# Patient Record
Sex: MALE | Race: BLACK OR AFRICAN AMERICAN | NOT HISPANIC OR LATINO | URBAN - METROPOLITAN AREA
[De-identification: names, ages, dates, MRNs, and addresses within clinical notes are randomized per-mention and may not be internally consistent; named-entity substitution may affect disease eponyms.]

---

## 2023-06-16 DIAGNOSIS — I10 ESSENTIAL HYPERTENSION, BENIGN: ICD-10-CM

## 2023-06-20 RX ORDER — HYDROCHLOROTHIAZIDE 25 MG/1
25 TABLET ORAL DAILY
Qty: 30 TABLET | Refills: 0 | Status: SHIPPED | OUTPATIENT
Start: 2023-06-20 | End: 2023-07-17

## 2023-06-20 RX ORDER — AMLODIPINE BESYLATE 5 MG/1
5 TABLET ORAL DAILY
Qty: 30 TABLET | Refills: 0 | Status: SHIPPED | OUTPATIENT
Start: 2023-06-20 | End: 2023-07-17

## 2023-06-23 ENCOUNTER — APPOINTMENT (OUTPATIENT)
Dept: PRIMARY CARE | Facility: CLINIC | Age: 43
End: 2023-06-23
Payer: COMMERCIAL

## 2023-07-17 DIAGNOSIS — I10 ESSENTIAL HYPERTENSION, BENIGN: ICD-10-CM

## 2023-07-17 RX ORDER — HYDROCHLOROTHIAZIDE 25 MG/1
TABLET ORAL
Qty: 30 TABLET | Refills: 0 | Status: SHIPPED | OUTPATIENT
Start: 2023-07-17 | End: 2023-08-28 | Stop reason: SDUPTHER

## 2023-07-17 RX ORDER — AMLODIPINE BESYLATE 5 MG/1
5 TABLET ORAL DAILY
Qty: 30 TABLET | Refills: 0 | Status: SHIPPED | OUTPATIENT
Start: 2023-07-17 | End: 2023-08-28 | Stop reason: SDUPTHER

## 2023-08-28 ENCOUNTER — OFFICE VISIT (OUTPATIENT)
Dept: PRIMARY CARE | Facility: CLINIC | Age: 43
End: 2023-08-28
Payer: COMMERCIAL

## 2023-08-28 VITALS
WEIGHT: 301 LBS | SYSTOLIC BLOOD PRESSURE: 150 MMHG | BODY MASS INDEX: 39.71 KG/M2 | HEART RATE: 60 BPM | DIASTOLIC BLOOD PRESSURE: 88 MMHG

## 2023-08-28 DIAGNOSIS — Z00.00 HEALTH CARE MAINTENANCE: Primary | ICD-10-CM

## 2023-08-28 DIAGNOSIS — I10 ESSENTIAL HYPERTENSION, BENIGN: ICD-10-CM

## 2023-08-28 PROCEDURE — 99213 OFFICE O/P EST LOW 20 MIN: CPT | Performed by: INTERNAL MEDICINE

## 2023-08-28 PROCEDURE — 3079F DIAST BP 80-89 MM HG: CPT | Performed by: INTERNAL MEDICINE

## 2023-08-28 PROCEDURE — 3077F SYST BP >= 140 MM HG: CPT | Performed by: INTERNAL MEDICINE

## 2023-08-28 PROCEDURE — 1036F TOBACCO NON-USER: CPT | Performed by: INTERNAL MEDICINE

## 2023-08-28 RX ORDER — AMLODIPINE BESYLATE 5 MG/1
5 TABLET ORAL DAILY
Qty: 90 TABLET | Refills: 0 | Status: SHIPPED | OUTPATIENT
Start: 2023-08-28 | End: 2023-08-28 | Stop reason: SDUPTHER

## 2023-08-28 RX ORDER — AMLODIPINE BESYLATE 5 MG/1
10 TABLET ORAL DAILY
Qty: 90 TABLET | Refills: 0 | Status: SHIPPED | OUTPATIENT
Start: 2023-08-28 | End: 2023-11-20

## 2023-08-28 RX ORDER — HYDROCHLOROTHIAZIDE 25 MG/1
25 TABLET ORAL DAILY
Qty: 90 TABLET | Refills: 0 | Status: SHIPPED | OUTPATIENT
Start: 2023-08-28 | End: 2023-11-22

## 2023-08-28 ASSESSMENT — PATIENT HEALTH QUESTIONNAIRE - PHQ9
2. FEELING DOWN, DEPRESSED OR HOPELESS: NOT AT ALL
1. LITTLE INTEREST OR PLEASURE IN DOING THINGS: NOT AT ALL
SUM OF ALL RESPONSES TO PHQ9 QUESTIONS 1 AND 2: 0

## 2023-08-28 NOTE — PROGRESS NOTES
Subjective   Patient ID: Madhu Corado is a 43 y.o. male who presents for Follow-up.    HPI     Patient is a 43-year-old male with past medical history of hypertension who presents for follow-up.  Of note patient had a recent rotator cuff surgery in April and has been out of work since then.  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  He has apointment to see the orthopedist today for reevaluation of the shoulder.    His blood pressure today is uncontrolled but has been out of his medications for over 24 hours.  He needs refills.  He denies headache changes in vision orthopnea.  He reports otherwise compliance with his medication.  He has not been seen in more than a year.    Review of Systems  Constitutional: No fever or chills  Cardiovascular: no chest pain, no palpitations and no syncope.   Respiratory: no cough, no shortness of breath during exertion and no shortness of breath at rest.   Gastrointestinal: no abdominal pain, no nausea and no vomiting.  Neuro: No Headache, no dizziness    Objective   /88   Pulse 60   Wt 137 kg (301 lb)   BMI 39.71 kg/m²     Physical Exam  Constitutional: Alert and in no acute distress. Well developed, well nourished  Head and Face: Head and face: Normal.    Cardiovascular: Heart rate and rhythm were normal, normal S1 and S2. No peripheral edema.   Pulmonary: No respiratory distress. Clear bilateral breath sounds.  Musculoskeletal: Gait and station: Normal. Muscle strength/tone: Normal.   Skin: Normal skin color and pigmentation, normal skin turgor, and no rash.    Psychiatric: Judgment and insight: Intact. Mood and affect: Normal.        Lab Results   Component Value Date    WBC 8.7 04/21/2022    HGB 14.4 04/21/2022    HCT 41.9 04/21/2022     04/21/2022    CHOL 162 04/21/2022    TRIG 236 (H) 04/21/2022    HDL 23.2 (A) 04/21/2022    ALT 18 04/21/2022    AST 17 04/21/2022      04/21/2022    K 3.8 04/21/2022     04/21/2022    CREATININE 1.05 04/21/2022    BUN 15 04/21/2022    CO2 26 04/21/2022    INR 1.0 06/07/2019       MR shoulder  Narrative: Interpreted By:  EMILIA ELIAS MD  MRN: [id number]  Patient Name: KEVIN ALVARES     STUDY:  MRI of the  Left shoulder with out IV contrast     INDICATION:  EVAL CUFF TEAR  M25.512: Left shoulder pain, unspecified chronicity     COMPARISON:  None     ACCESSION NUMBER(S):  [id number]     ORDERING CLINICIAN:  MAIDA BALDERAS     TECHNIQUE:  Multiplanar multisequence MRI of the  Left shoulder was performed  without intravenous contrast.     FINDINGS:  Acromioclavicular Joint:  Mild acromioclavicular joint osteoarthrosis  with osteophytes. No evidence for abnormal increased fluid in the  subacromial bursa.     Biceps Tendon: Mild intra-articular long head biceps tendinosis with  thickening and increased intermediate fluid signal.     Rotator Cuff:  Mild-to-moderate supraspinatus tendinosis with thickening and  increased intermediate fluid signal. No supraspinatus tendon tear.  Infraspinatus tendon is within normal limits.  Teres minor and subscapularis tendons are within normal limits.     Muscles:  Normal signal intensity and volume. No evidence of muscular  edema or atrophy.     Labrum: There is torn and detached anterior glenoid labrum attached  to a stripped periosteum of the anterior glenoid, consistent with a  APLSA lesion.     Articular Cartilage:  The articular cartilage of the glenoid and  humeral head are intact.     Bones:  Mild increased patchy fluid signal in the posterior greater  tuberosity at the site of infraspinatus attachment which may  represent mild Hill-Sachs impaction injury, however nonspecific.  Marrow signal is otherwise within normal limits.     Nerves:  No evidence of mass effect in the region of the  quadrilateral space or suprascapular nerve.     Other:  Small glenohumeral joint effusion.     Impression: 1.  Torn and detached anterior glenoid labrum attached to a stripped  periosteum of the anterior glenoid, consistent with a APLSA lesion.  2. Mild to moderate supraspinatus tendinosis without tear. Mild  intra-articular long head biceps tendinosis.  3. Mild acromioclavicular joint osteoarthrosis. Small glenohumeral  joint effusion.            Assessment/Plan   Problem List Items Addressed This Visit          Cardiac and Vasculature    Essential hypertension, benign    Relevant Medications    hydroCHLOROthiazide (HYDRODiuril) 25 mg tablet    amLODIPine (Norvasc) 5 mg tablet     Other Visit Diagnoses       Health care maintenance    -  Primary    Relevant Orders    CBC    Comprehensive metabolic panel    Lipid Panel          Hypertension  Uncontrolled at this time but has been out of his medications.  We will provide refills.  Advised to  his medications now then go see the orthopedist and then to follow-up in this office to have his right blood pressure rechecked.  If his blood pressure is not well controlled may need further adjustments and follow-up.  We will also check renal function lipid panel and CBC.    Follow-up 6 months for physical or earlier if blood pressure is significantly elevated today

## 2023-09-28 ENCOUNTER — LAB (OUTPATIENT)
Dept: LAB | Facility: LAB | Age: 43
End: 2023-09-28
Payer: COMMERCIAL

## 2023-09-28 ENCOUNTER — OFFICE VISIT (OUTPATIENT)
Dept: PRIMARY CARE | Facility: CLINIC | Age: 43
End: 2023-09-28
Payer: COMMERCIAL

## 2023-09-28 VITALS
SYSTOLIC BLOOD PRESSURE: 131 MMHG | BODY MASS INDEX: 40.24 KG/M2 | HEART RATE: 64 BPM | WEIGHT: 305 LBS | DIASTOLIC BLOOD PRESSURE: 76 MMHG

## 2023-09-28 DIAGNOSIS — Z00.00 HEALTH CARE MAINTENANCE: ICD-10-CM

## 2023-09-28 DIAGNOSIS — I10 ESSENTIAL HYPERTENSION, BENIGN: Primary | ICD-10-CM

## 2023-09-28 PROBLEM — F33.1 MAJOR DEPRESSIVE DISORDER, RECURRENT EPISODE, MODERATE DEGREE (MULTI): Chronic | Status: ACTIVE | Noted: 2022-02-18

## 2023-09-28 PROBLEM — E66.9 OBESITY: Status: ACTIVE | Noted: 2021-02-22

## 2023-09-28 PROBLEM — E78.1 HYPERTRIGLYCERIDEMIA: Status: ACTIVE | Noted: 2020-11-05

## 2023-09-28 LAB
ALANINE AMINOTRANSFERASE (SGPT) (U/L) IN SER/PLAS: 17 U/L (ref 10–52)
ALBUMIN (G/DL) IN SER/PLAS: 4.3 G/DL (ref 3.4–5)
ALKALINE PHOSPHATASE (U/L) IN SER/PLAS: 46 U/L (ref 33–120)
ANION GAP IN SER/PLAS: 13 MMOL/L (ref 10–20)
ASPARTATE AMINOTRANSFERASE (SGOT) (U/L) IN SER/PLAS: 17 U/L (ref 9–39)
BILIRUBIN TOTAL (MG/DL) IN SER/PLAS: 0.5 MG/DL (ref 0–1.2)
CALCIUM (MG/DL) IN SER/PLAS: 9 MG/DL (ref 8.6–10.6)
CARBON DIOXIDE, TOTAL (MMOL/L) IN SER/PLAS: 28 MMOL/L (ref 21–32)
CHLORIDE (MMOL/L) IN SER/PLAS: 106 MMOL/L (ref 98–107)
CHOLESTEROL (MG/DL) IN SER/PLAS: 145 MG/DL (ref 0–199)
CHOLESTEROL IN HDL (MG/DL) IN SER/PLAS: 27.2 MG/DL
CHOLESTEROL/HDL RATIO: 5.3
CREATININE (MG/DL) IN SER/PLAS: 1.1 MG/DL (ref 0.5–1.3)
ERYTHROCYTE DISTRIBUTION WIDTH (RATIO) BY AUTOMATED COUNT: 14.6 % (ref 11.5–14.5)
ERYTHROCYTE MEAN CORPUSCULAR HEMOGLOBIN CONCENTRATION (G/DL) BY AUTOMATED: 35.2 G/DL (ref 32–36)
ERYTHROCYTE MEAN CORPUSCULAR VOLUME (FL) BY AUTOMATED COUNT: 74 FL (ref 80–100)
ERYTHROCYTES (10*6/UL) IN BLOOD BY AUTOMATED COUNT: 5.42 X10E12/L (ref 4.5–5.9)
GFR MALE: 86 ML/MIN/1.73M2
GLUCOSE (MG/DL) IN SER/PLAS: 85 MG/DL (ref 74–99)
HEMATOCRIT (%) IN BLOOD BY AUTOMATED COUNT: 40.1 % (ref 41–52)
HEMOGLOBIN (G/DL) IN BLOOD: 14.1 G/DL (ref 13.5–17.5)
LDL: 101 MG/DL (ref 0–99)
LEUKOCYTES (10*3/UL) IN BLOOD BY AUTOMATED COUNT: 8 X10E9/L (ref 4.4–11.3)
NRBC (PER 100 WBCS) BY AUTOMATED COUNT: 0 /100 WBC (ref 0–0)
PLATELETS (10*3/UL) IN BLOOD AUTOMATED COUNT: 260 X10E9/L (ref 150–450)
POTASSIUM (MMOL/L) IN SER/PLAS: 3.8 MMOL/L (ref 3.5–5.3)
PROTEIN TOTAL: 6.7 G/DL (ref 6.4–8.2)
SODIUM (MMOL/L) IN SER/PLAS: 143 MMOL/L (ref 136–145)
TRIGLYCERIDE (MG/DL) IN SER/PLAS: 86 MG/DL (ref 0–149)
UREA NITROGEN (MG/DL) IN SER/PLAS: 15 MG/DL (ref 6–23)
VLDL: 17 MG/DL (ref 0–40)

## 2023-09-28 PROCEDURE — 85027 COMPLETE CBC AUTOMATED: CPT

## 2023-09-28 PROCEDURE — 3078F DIAST BP <80 MM HG: CPT | Performed by: INTERNAL MEDICINE

## 2023-09-28 PROCEDURE — 80053 COMPREHEN METABOLIC PANEL: CPT

## 2023-09-28 PROCEDURE — 99213 OFFICE O/P EST LOW 20 MIN: CPT | Performed by: INTERNAL MEDICINE

## 2023-09-28 PROCEDURE — 36415 COLL VENOUS BLD VENIPUNCTURE: CPT

## 2023-09-28 PROCEDURE — 80061 LIPID PANEL: CPT

## 2023-09-28 PROCEDURE — 3075F SYST BP GE 130 - 139MM HG: CPT | Performed by: INTERNAL MEDICINE

## 2023-09-28 PROCEDURE — 1036F TOBACCO NON-USER: CPT | Performed by: INTERNAL MEDICINE

## 2023-09-28 NOTE — PROGRESS NOTES
Subjective   Patient ID: Madhu Corado is a 43 y.o. male who presents for Follow-up.    HPI     Patient is a 43-year-old male with past medical history of hypertension who presents for follow-up.  Patient been taking his medications as prescribed.  He is here for follow-up.  Blood pressure today better controlled at 131/76.  No headaches changes in vision orthopnea.    Review of Systems  Constitutional: No fever or chills  Cardiovascular: no chest pain, no palpitations and no syncope.   Respiratory: no cough, no shortness of breath during exertion and no shortness of breath at rest.   Gastrointestinal: no abdominal pain, no nausea and no vomiting.  Neuro: No Headache, no dizziness    Objective   /76   Pulse 64   Wt 138 kg (305 lb)   BMI 40.24 kg/m²     Physical Exam  Constitutional: Alert and in no acute distress. Well developed, well nourished  Head and Face: Head and face: Normal.    Cardiovascular: Heart rate and rhythm were normal, normal S1 and S2. No peripheral edema.   Pulmonary: No respiratory distress. Clear bilateral breath sounds.  Musculoskeletal: Gait and station: Normal. Muscle strength/tone: Normal.   Skin: Normal skin color and pigmentation, normal skin turgor, and no rash.    Psychiatric: Judgment and insight: Intact. Mood and affect: Normal.        Lab Results   Component Value Date    WBC 8.3 05/31/2023    HGB 14.2 05/31/2023    HCT 41.3 05/31/2023     05/31/2023    CHOL 162 04/21/2022    TRIG 236 (H) 04/21/2022    HDL 23.2 (A) 04/21/2022    ALT 18 05/31/2023    AST 18 05/31/2023     05/31/2023    K 3.6 05/31/2023     05/31/2023    CREATININE 1.1 05/31/2023    BUN 17 05/31/2023    CO2 26 05/31/2023    INR 0.9 05/31/2023       OUTSIDE IMAGING SCAN  Ordered by an unspecified provider.            Assessment/Plan   Problem List Items Addressed This Visit       Essential hypertension, benign - Primary     Controlled while taking medications.  No medication adjustments at  this time.  Advised to complete his screening laboratory test.  Deferring flu vaccine.  Follow-up 6 months for wellness and follow-up         Relevant Orders    Follow Up In [facility] - PCP - Established

## 2023-09-28 NOTE — ASSESSMENT & PLAN NOTE
Controlled while taking medications.  No medication adjustments at this time.  Advised to complete his screening laboratory test.  Deferring flu vaccine.  Follow-up 6 months for wellness and follow-up

## 2023-10-11 PROBLEM — M25.662 STIFFNESS OF LEFT KNEE: Status: ACTIVE | Noted: 2023-10-11

## 2023-10-11 PROBLEM — S43.492A BANKART LESION OF LEFT SHOULDER: Status: ACTIVE | Noted: 2023-10-11

## 2023-10-11 PROBLEM — D58.2 HEMOGLOBIN C TRAIT (CMS-HCC): Status: ACTIVE | Noted: 2023-10-11

## 2023-10-11 PROBLEM — E66.01 MORBID OBESITY WITH BMI OF 40.0-44.9, ADULT (MULTI): Status: ACTIVE | Noted: 2023-10-11

## 2023-10-11 PROBLEM — S76.119A QUADRICEPS TENDON RUPTURE: Status: ACTIVE | Noted: 2023-10-11

## 2023-10-11 PROBLEM — M25.469 KNEE SWELLING: Status: ACTIVE | Noted: 2023-10-11

## 2023-10-11 RX ORDER — IBUPROFEN 800 MG/1
800 TABLET ORAL 3 TIMES DAILY
COMMUNITY

## 2023-10-11 NOTE — PROGRESS NOTES
"Physical Therapy Examination and Treatment Note    Patient Name: Kevin Alvraes  MRN Number: [id number]  Initial Examination Date:  6/8/2023  Referring Provider:  Dr. George  ONSET DATE: (DOS  -6/1/23)     Today's Date: 10/12/2023  Time Calculation  Start Time: 0925  Stop Time: 1003  Time Calculation (min): 38 min    Insurance:  Visit Number: 21  Approved Visits: [place]  Insurance Auth Dates: NA  CMS Certification Period: NA    Precautions:    PT Clinical Problem:  L shoulder pain    Problem List  1. Bankart lesion of left shoulder, subsequent encounter            Subjective:  Patient reports that he still has pain with ER.  Still feels weak.  Objective:  Outcome Measure:     Treatment:    UBE, 6 min, alt each min  - bent over rows 10#, 3 x 10, emphasize scapular retraction  - ABC write out 1# mediball  - shoulder flexion 2x15, 2 pounds  - shoulder scaption 2x15, 2 pounds   - shoulder abduction 2x15, 2 pounds  - standing ER at 90* 10\" x 10 (purple bungee)    Home Program/Education:     Assessment:  Patient continues to have difficulty with active resisted ER but tolerated session well.     Plan  Cont with strengthening to return to max functional mobility     Care Plan/Goals:  In 12 weeks, KEVIN ALVARES will achieve the following goals:     1) Full return to prior level of function to allow continued working capability. - 75% complete  2) Reduce left shoulder pain levels 2/10 maximum in the last week for improvements in quality of life and ability to sleep. - 75% complete  3) Increase shoulder AROM to greater than or equal to 160° for ease of reaching overhead for light objects. - 100% complete  4) Increase left shoulder strength testing greater than or equal to 4+/5 for improvements in ability to lift and manipulate objects. - 65% complete  5) Decrease quick DASH outcome measure to less than or equal to 10 for meaningful and clinical improvements in patients condition. - 75% complete  6) Increase bilateral " scapular manual muscle testing of rhomboids 4+/5, middle trapezius 4/5 improved scapulohumeral rhythm.

## 2023-10-12 ENCOUNTER — TREATMENT (OUTPATIENT)
Dept: PHYSICAL THERAPY | Facility: CLINIC | Age: 43
End: 2023-10-12
Payer: COMMERCIAL

## 2023-10-12 DIAGNOSIS — S43.492D BANKART LESION OF LEFT SHOULDER, SUBSEQUENT ENCOUNTER: Primary | ICD-10-CM

## 2023-10-12 PROCEDURE — 97110 THERAPEUTIC EXERCISES: CPT | Mod: GP,CQ

## 2023-10-18 ENCOUNTER — OFFICE VISIT (OUTPATIENT)
Dept: ORTHOPEDIC SURGERY | Facility: HOSPITAL | Age: 43
End: 2023-10-18
Payer: COMMERCIAL

## 2023-10-18 DIAGNOSIS — M25.512 CHRONIC LEFT SHOULDER PAIN: Primary | ICD-10-CM

## 2023-10-18 DIAGNOSIS — G89.29 CHRONIC LEFT SHOULDER PAIN: Primary | ICD-10-CM

## 2023-10-18 PROCEDURE — 1036F TOBACCO NON-USER: CPT | Performed by: ORTHOPAEDIC SURGERY

## 2023-10-18 PROCEDURE — 99212 OFFICE O/P EST SF 10 MIN: CPT | Performed by: ORTHOPAEDIC SURGERY

## 2023-10-18 NOTE — PROGRESS NOTES
For 3-month postop evaluation status post anterior stabilization left shoulder he is doing generally well but still feels pain posteriorly and external rotation abduction.  He does not feel ready to return to work yet    Has full range of motion of the shoulder external rotation abduction of 90 degrees with pain posteriorly but no apprehension.  Motor power in abduction is 5/5.    Left shoulder instability    Patient has persistent pain at the extremes of range of motion.  I do not think he should return to work yet.  I would like him to continue therapy and follow another month.    This was dictated using voice recognition software and not corrected for grammatical or spelling errors.

## 2023-10-18 NOTE — LETTER
October 18, 2023     Patient: Madhu Corado   YOB: 1980   Date of Visit: 10/18/2023       To Whom It May Concern:    It is my medical opinion that Madhu Corado should remain out of work until FOLLOW UP ON 11/15 .    If you have any questions or concerns, please don't hesitate to call.         Sincerely,        Arya George MD    CC: No Recipients

## 2023-10-24 NOTE — PROGRESS NOTES
Physical Therapy Examination and Treatment Note    Patient Name: Kevin Alvares  MRN Number: [id number]  Initial Examination Date:  6/8/2023  Referring Provider:  Dr. George  ONSET DATE: (DOS  -6/1/23)     Today's Date: 10/25/2023  Time Calculation  Start Time: 0940  Stop Time: 1025  Time Calculation (min): 45 min    Insurance:  Visit Number: 22  Approved Visits: [place]  Insurance Auth Dates: NA  CMS Certification Period: NA    Precautions:    PT Clinical Problem:  L shoulder pain    Problem List  1. Bankart lesion of left shoulder, subsequent encounter            Subjective:  Patient reports that the L shoulder is feeling pretty good.  Its the right one that is bothering me.  Objective:  Outcome Measure:     Treatment:    UBE, 6 min, alt each min  - Lat pull 45#  3 x 10   - tricep ext 25#  3 x 10   - shoulder flexion 2x10, 3 pounds  - shoulder scaption 2x10, 3 pounds   - shoulder abduction 2x10, 3 pounds  - standing ER with R tubing 3 x 10 B  - wallslides with YTB around hands 2 x 10     Home Program/Education:     Assessment:  Patient tolerated progression of strengthening well. Fatigued but no increased pain.   Will benefit from strengthening on both UE as he is complaining of pain on the R.     Plan  Cont with strengthening to return to max functional mobility     Care Plan/Goals:  In 12 weeks, KEVIN ALVARES will achieve the following goals:     1) Full return to prior level of function to allow continued working capability. - 75% complete  2) Reduce left shoulder pain levels 2/10 maximum in the last week for improvements in quality of life and ability to sleep. - 75% complete  3) Increase shoulder AROM to greater than or equal to 160° for ease of reaching overhead for light objects. - 100% complete  4) Increase left shoulder strength testing greater than or equal to 4+/5 for improvements in ability to lift and manipulate objects. - 65% complete  5) Decrease quick DASH outcome measure to less than or equal to  10 for meaningful and clinical improvements in patients condition. - 75% complete  6) Increase bilateral scapular manual muscle testing of rhomboids 4+/5, middle trapezius 4/5 improved scapulohumeral rhythm.

## 2023-10-25 ENCOUNTER — TREATMENT (OUTPATIENT)
Dept: PHYSICAL THERAPY | Facility: CLINIC | Age: 43
End: 2023-10-25
Payer: COMMERCIAL

## 2023-10-25 DIAGNOSIS — S43.492D BANKART LESION OF LEFT SHOULDER, SUBSEQUENT ENCOUNTER: Primary | ICD-10-CM

## 2023-10-25 PROCEDURE — 97110 THERAPEUTIC EXERCISES: CPT | Mod: GP,CQ

## 2023-11-18 DIAGNOSIS — I10 ESSENTIAL HYPERTENSION, BENIGN: ICD-10-CM

## 2023-11-20 RX ORDER — AMLODIPINE BESYLATE 5 MG/1
10 TABLET ORAL DAILY
Qty: 180 TABLET | Refills: 1 | Status: SHIPPED | OUTPATIENT
Start: 2023-11-20 | End: 2024-05-09 | Stop reason: SDUPTHER

## 2023-11-22 DIAGNOSIS — I10 ESSENTIAL HYPERTENSION, BENIGN: ICD-10-CM

## 2023-11-22 RX ORDER — HYDROCHLOROTHIAZIDE 25 MG/1
25 TABLET ORAL DAILY
Qty: 90 TABLET | Refills: 0 | Status: SHIPPED | OUTPATIENT
Start: 2023-11-22 | End: 2024-02-21

## 2023-11-28 ENCOUNTER — DOCUMENTATION (OUTPATIENT)
Dept: PHYSICAL THERAPY | Facility: CLINIC | Age: 43
End: 2023-11-28
Payer: COMMERCIAL

## 2023-11-28 NOTE — PROGRESS NOTES
Discharge Summary    Name: Madhu Corado  MRN: [id number]  : 1980  Date: 23    Discharge Summary: PT    Discharge Information: Date of discharge 23    Rehab Discharge Reason: Failed to schedule and/or keep follow-up appointment(s)

## 2024-02-14 ENCOUNTER — APPOINTMENT (OUTPATIENT)
Dept: ORTHOPEDIC SURGERY | Facility: HOSPITAL | Age: 44
End: 2024-02-14
Payer: COMMERCIAL

## 2024-02-20 DIAGNOSIS — I10 ESSENTIAL HYPERTENSION, BENIGN: ICD-10-CM

## 2024-02-21 RX ORDER — HYDROCHLOROTHIAZIDE 25 MG/1
25 TABLET ORAL DAILY
Qty: 90 TABLET | Refills: 0 | Status: SHIPPED | OUTPATIENT
Start: 2024-02-21 | End: 2024-05-09 | Stop reason: SDUPTHER

## 2024-04-16 ENCOUNTER — PATIENT OUTREACH (OUTPATIENT)
Dept: PRIMARY CARE | Facility: CLINIC | Age: 44
End: 2024-04-16
Payer: COMMERCIAL

## 2024-04-16 NOTE — PROGRESS NOTES
Discharge Facility:[facility]  Discharge Diagnosis:Chest pain  Admission Date:4/10/24  Discharge Date: 4/12/24    PCP Appointment Date:4/25/24  Specialist Appointment Date: N/A  Hospital Encounter and Summary: Linked   See discharge assessment below for further details  Engagement  Call Start Time: 1053 (4/16/2024 10:53 AM)    Medications  Medications reviewed with patient/caregiver?: Yes (4/16/2024 10:53 AM)  Is the patient having any side effects they believe may be caused by any medication additions or changes?: No (4/16/2024 10:53 AM)  Does the patient have all medications ordered at discharge?: Yes (4/16/2024 10:53 AM)  Prescription Comments: pantoprazole DR (PROTONIX) 40 mg tablet  Take 1 tablet by mouth daily at 6 am. 30 tablet  0 04/13/2024 05/13/2024  ibuprofen (MOTRIN) 600 mg tablet  Take 1 tablet by mouth every 6 hours. 120 tablet  0 04/12/2024 05/12/2024  colchicine 0.6 mg tablet  Take 1 tablet by mouth two times a day. 60 tablet  0 04/12/2024 05/12/2024  aspirin 81 mg chewable tablet (4/16/2024 10:53 AM)  Is the patient taking all medications as directed (includes completed medication regime)?: Yes (4/16/2024 10:53 AM)  Medication Comments: SEE MED LIST (4/16/2024 10:53 AM)    Appointments  Does the patient have a primary care provider?: Yes (4/16/2024 10:53 AM)  Care Management Interventions: Verified appointment date/time/provider ([facility] 4/25/24) (4/16/2024 10:53 AM)  Has the patient kept scheduled appointments due by today?: Yes (4/16/2024 10:53 AM)  Care Management Interventions: Advised patient to keep appointment (4/16/2024 10:53 AM)    Patient Teaching  Does the patient have access to their discharge instructions?: Yes (4/16/2024 10:53 AM)  Care Management Interventions: Reviewed instructions with patient (4/16/2024 10:53 AM)  What is the patient's perception of their health status since discharge?: Improving (4/16/2024 10:53 AM)  Is the patient/caregiver able to teach back the hierarchy of who to  call/visit for symptoms/problems? PCP, Specialist, Home Health nurse, Urgent Care, ED, 911: Yes (4/16/2024 10:53 AM)    Wrap Up  Wrap Up Additional Comments: This CM spoke with pt via phone. Pt reports doing well at home since discharge. New meds reviewed. Pt denies CP and SOB. Patient denies any further discharge questions/needs at this time. Emphasized that Follow up is needed after discharge to review the hospital recommendations, assess your response to your treatment.  Pt aware of my availability for non-emergent concerns. Contact info provided to patient (4/16/2024 10:53 AM)      Regina Smiley LPN

## 2024-04-25 ENCOUNTER — OFFICE VISIT (OUTPATIENT)
Dept: PRIMARY CARE | Facility: CLINIC | Age: 44
End: 2024-04-25
Payer: COMMERCIAL

## 2024-04-25 VITALS
SYSTOLIC BLOOD PRESSURE: 149 MMHG | WEIGHT: 304 LBS | HEART RATE: 72 BPM | DIASTOLIC BLOOD PRESSURE: 84 MMHG | BODY MASS INDEX: 40.11 KG/M2 | OXYGEN SATURATION: 99 %

## 2024-04-25 DIAGNOSIS — I10 ESSENTIAL HYPERTENSION, BENIGN: ICD-10-CM

## 2024-04-25 DIAGNOSIS — E87.6 HYPOKALEMIA: ICD-10-CM

## 2024-04-25 DIAGNOSIS — R07.89 ATYPICAL CHEST PAIN: Primary | ICD-10-CM

## 2024-04-25 PROBLEM — O35.2XX1 HEREDITARY DISEASE IN FAMILY POSSIBLY AFFECTING FETUS, AFFECTING MANAGEMENT OF MOTHER IN PREGNANCY, FETUS 1 (HHS-HCC): Status: RESOLVED | Noted: 2023-10-11 | Resolved: 2024-04-25

## 2024-04-25 PROCEDURE — 99214 OFFICE O/P EST MOD 30 MIN: CPT | Performed by: INTERNAL MEDICINE

## 2024-04-25 PROCEDURE — 3079F DIAST BP 80-89 MM HG: CPT | Performed by: INTERNAL MEDICINE

## 2024-04-25 PROCEDURE — 3077F SYST BP >= 140 MM HG: CPT | Performed by: INTERNAL MEDICINE

## 2024-04-25 PROCEDURE — 1036F TOBACCO NON-USER: CPT | Performed by: INTERNAL MEDICINE

## 2024-04-25 RX ORDER — ATORVASTATIN CALCIUM 10 MG/1
10 TABLET, FILM COATED ORAL
COMMUNITY

## 2024-04-25 RX ORDER — COLCHICINE 0.6 MG/1
0.6 TABLET ORAL 2 TIMES DAILY
COMMUNITY
Start: 2024-04-12 | End: 2024-05-12

## 2024-04-25 RX ORDER — PANTOPRAZOLE SODIUM 40 MG/1
40 TABLET, DELAYED RELEASE ORAL
COMMUNITY
Start: 2024-04-13 | End: 2024-05-13

## 2024-04-25 RX ORDER — NAPROXEN SODIUM 220 MG/1
81 TABLET, FILM COATED ORAL DAILY
COMMUNITY
Start: 2024-04-12

## 2024-04-25 NOTE — ASSESSMENT & PLAN NOTE
Uncontrolled.  Please take the amlodipine 10.  Check blood pressure at home.  Follow-up 1 month for reevaluation

## 2024-04-25 NOTE — PROGRESS NOTES
Subjective   Patient ID: Madhu Corado is a 43 y.o. male who presents for Hospital Follow-up.    HPI     Patient is a 43-year-old male with past medical history of hypertension who presents for follow-up from recent hospitalization.  Patient states that 1 month ago he was experiencing some chest pain.  He went to the emergency room and was admitted overnight.  Troponins were unremarkable.  Limited echocardiogram showed normal ejection fraction but did not complete stress test.  He subsequently discharged on statin aspirin and colchicine.  He had a post discharge follow-up from the heart failure team and he was very confused by this.  He still having some atypical chest pain that comes and goes and lasts for a few minutes.  It can be exertional at time but not all the time.  It does not radiate to the arm.  No history of coronary artery disease    Uncontrolled hypertension he is only been taking 5 mg of the amlodipine rather than 10 mg.  No headache change in vision orthopnea.    Review of Systems  Constitutional: No fever or chills  Cardiovascular: no chest pain, no palpitations and no syncope.   Respiratory: no cough, no shortness of breath during exertion and no shortness of breath at rest.   Gastrointestinal: no abdominal pain, no nausea and no vomiting.  Neuro: No Headache, no dizziness    Objective   /84   Pulse 72   Wt 138 kg (304 lb)   SpO2 99%   BMI 40.11 kg/m²     Physical Exam  Constitutional: Alert and in no acute distress. Well developed, well nourished  Head and Face: Head and face: Normal.    Cardiovascular: Heart rate and rhythm were normal, normal S1 and S2. No peripheral edema.   Pulmonary: No respiratory distress. Clear bilateral breath sounds.  Musculoskeletal: Gait and station: Normal. Muscle strength/tone: Normal.   Skin: Normal skin color and pigmentation, normal skin turgor, and no rash.    Psychiatric: Judgment and insight: Intact. Mood and affect: Normal.        Lab Results    Component Value Date    WBC 8.0 09/28/2023    HGB 14.1 09/28/2023    HCT 40.1 (L) 09/28/2023     09/28/2023    CHOL 145 09/28/2023    TRIG 86 09/28/2023    HDL 27.2 (A) 09/28/2023    ALT 17 09/28/2023    AST 17 09/28/2023     09/28/2023    K 3.8 09/28/2023     09/28/2023    CREATININE 1.10 09/28/2023    BUN 15 09/28/2023    CO2 28 09/28/2023    INR 0.9 05/31/2023    HGBA1C 5.1 04/10/2024       OUTSIDE IMAGING SCAN  Ordered by an unspecified provider.            Assessment/Plan   Problem List Items Addressed This Visit       Essential hypertension, benign     Uncontrolled.  Please take the amlodipine 10.  Check blood pressure at home.  Follow-up 1 month for reevaluation         Relevant Orders    Follow Up In [facility] - PCP - Established    Atypical chest pain - Primary     Given the atypical nature in the setting of uncontrolled hypertension will check stress test to rule out cardiac etiology and refer to cardiology given patient's anxiety related to the diagnosis given to him in the hospital.  Continue statin and aspirin at this time           Relevant Orders    Referral to Cardiology    Echocardiogram Stress Test     Other Visit Diagnoses       Hypokalemia        Relevant Orders    Potassium

## 2024-04-25 NOTE — ASSESSMENT & PLAN NOTE
Given the atypical nature in the setting of uncontrolled hypertension will check stress test to rule out cardiac etiology and refer to cardiology given patient's anxiety related to the diagnosis given to him in the hospital.  Continue statin and aspirin at this time

## 2024-05-01 ENCOUNTER — PATIENT OUTREACH (OUTPATIENT)
Dept: PRIMARY CARE | Facility: CLINIC | Age: 44
End: 2024-05-01
Payer: COMMERCIAL

## 2024-05-07 ENCOUNTER — APPOINTMENT (OUTPATIENT)
Dept: CARDIOLOGY | Facility: CLINIC | Age: 44
End: 2024-05-07
Payer: COMMERCIAL

## 2024-05-09 DIAGNOSIS — I10 ESSENTIAL HYPERTENSION, BENIGN: ICD-10-CM

## 2024-05-10 RX ORDER — AMLODIPINE BESYLATE 5 MG/1
10 TABLET ORAL DAILY
Qty: 180 TABLET | Refills: 0 | Status: SHIPPED | OUTPATIENT
Start: 2024-05-10

## 2024-05-10 RX ORDER — HYDROCHLOROTHIAZIDE 25 MG/1
25 TABLET ORAL DAILY
Qty: 90 TABLET | Refills: 0 | Status: SHIPPED | OUTPATIENT
Start: 2024-05-10

## 2024-05-13 PROBLEM — F17.200 NICOTINE USE DISORDER: Status: ACTIVE | Noted: 2024-04-11

## 2024-05-13 PROBLEM — I20.0 UNSTABLE ANGINA (MULTI): Status: ACTIVE | Noted: 2024-04-10

## 2024-05-16 ENCOUNTER — PATIENT OUTREACH (OUTPATIENT)
Dept: PRIMARY CARE | Facility: CLINIC | Age: 44
End: 2024-05-16
Payer: COMMERCIAL

## 2024-07-11 ENCOUNTER — PATIENT OUTREACH (OUTPATIENT)
Dept: PRIMARY CARE | Facility: CLINIC | Age: 44
End: 2024-07-11

## 2024-07-11 ENCOUNTER — APPOINTMENT (OUTPATIENT)
Dept: PRIMARY CARE | Facility: CLINIC | Age: 44
End: 2024-07-11
Payer: COMMERCIAL